# Patient Record
Sex: FEMALE | Race: WHITE
[De-identification: names, ages, dates, MRNs, and addresses within clinical notes are randomized per-mention and may not be internally consistent; named-entity substitution may affect disease eponyms.]

---

## 2021-04-26 ENCOUNTER — HOSPITAL ENCOUNTER (OUTPATIENT)
Age: 21
End: 2021-04-26
Payer: COMMERCIAL

## 2021-04-26 VITALS — BODY MASS INDEX: 34.9 KG/M2

## 2021-04-26 DIAGNOSIS — N83.201: Primary | ICD-10-CM

## 2021-04-26 PROCEDURE — 76830 TRANSVAGINAL US NON-OB: CPT

## 2021-04-26 PROCEDURE — 93976 VASCULAR STUDY: CPT

## 2021-04-27 ENCOUNTER — HOSPITAL ENCOUNTER (OUTPATIENT)
Dept: HOSPITAL 100 - SDC | Age: 21
Discharge: HOME | End: 2021-04-27
Payer: COMMERCIAL

## 2021-04-27 VITALS
HEART RATE: 79 BPM | DIASTOLIC BLOOD PRESSURE: 57 MMHG | RESPIRATION RATE: 16 BRPM | SYSTOLIC BLOOD PRESSURE: 99 MMHG | OXYGEN SATURATION: 100 %

## 2021-04-27 VITALS
OXYGEN SATURATION: 98 % | DIASTOLIC BLOOD PRESSURE: 86 MMHG | RESPIRATION RATE: 16 BRPM | SYSTOLIC BLOOD PRESSURE: 133 MMHG | TEMPERATURE: 97.3 F | HEART RATE: 89 BPM

## 2021-04-27 VITALS
SYSTOLIC BLOOD PRESSURE: 99 MMHG | OXYGEN SATURATION: 100 % | TEMPERATURE: 98.7 F | RESPIRATION RATE: 18 BRPM | DIASTOLIC BLOOD PRESSURE: 57 MMHG | HEART RATE: 64 BPM

## 2021-04-27 VITALS
DIASTOLIC BLOOD PRESSURE: 77 MMHG | OXYGEN SATURATION: 100 % | HEART RATE: 60 BPM | SYSTOLIC BLOOD PRESSURE: 99 MMHG | RESPIRATION RATE: 16 BRPM | TEMPERATURE: 97.34 F

## 2021-04-27 VITALS
SYSTOLIC BLOOD PRESSURE: 99 MMHG | TEMPERATURE: 99.14 F | HEART RATE: 76 BPM | DIASTOLIC BLOOD PRESSURE: 57 MMHG | RESPIRATION RATE: 16 BRPM | OXYGEN SATURATION: 95 %

## 2021-04-27 VITALS — BODY MASS INDEX: 34.2 KG/M2 | BODY MASS INDEX: 34.9 KG/M2

## 2021-04-27 DIAGNOSIS — Z20.828: ICD-10-CM

## 2021-04-27 DIAGNOSIS — N83.01: Primary | ICD-10-CM

## 2021-04-27 LAB
DEPRECATED RDW RBC: 43.3 FL (ref 35.1–43.9)
ERYTHROCYTE [DISTWIDTH] IN BLOOD: 12.5 % (ref 11.6–14.6)
HCT VFR BLD AUTO: 40.9 % (ref 37–47)
HEMOGLOBIN: 13.1 G/DL (ref 12–15)
HGB BLD-MCNC: 13.1 G/DL (ref 12–15)
IMMATURE GRANULOCYTES COUNT: 0.01 X10^3/UL (ref 0–0)
INTERNAL QC VALIDATED?: (no result)
MCV RBC: 93.6 FL (ref 81–99)
MEAN CORP HGB CONC: 32 G/DL (ref 32–36)
MEAN PLATELET VOL.: 10.8 FL (ref 6.2–12)
NRBC FLAGGED BY ANALYZER: 0 % (ref 0–5)
PLATELET # BLD: 251 K/MM3 (ref 150–450)
PLATELET COUNT: 251 K/MM3 (ref 150–450)
RBC # BLD AUTO: 4.37 M/MM3 (ref 4.2–5.4)
RBC DISTRIBUTION WIDTH CV: 12.5 % (ref 11.6–14.6)
RBC DISTRIBUTION WIDTH SD: 43.3 FL (ref 35.1–43.9)
WBC # BLD AUTO: 6.1 K/MM3 (ref 4.4–11)
WHITE BLOOD COUNT: 6.1 K/MM3 (ref 4.4–11)

## 2021-04-27 PROCEDURE — 87426 SARSCOV CORONAVIRUS AG IA: CPT

## 2021-04-27 PROCEDURE — C9803 HOPD COVID-19 SPEC COLLECT: HCPCS

## 2021-04-27 PROCEDURE — 86900 BLOOD TYPING SEROLOGIC ABO: CPT

## 2021-04-27 PROCEDURE — 00840 ANES IPER PX LOWER ABD NOS: CPT

## 2021-04-27 PROCEDURE — 85025 COMPLETE CBC W/AUTO DIFF WBC: CPT

## 2021-04-27 PROCEDURE — 88305 TISSUE EXAM BY PATHOLOGIST: CPT

## 2021-04-27 PROCEDURE — 58661 LAPAROSCOPY REMOVE ADNEXA: CPT

## 2021-04-27 PROCEDURE — 86850 RBC ANTIBODY SCREEN: CPT

## 2021-04-27 PROCEDURE — 86901 BLOOD TYPING SEROLOGIC RH(D): CPT

## 2021-04-27 PROCEDURE — 81025 URINE PREGNANCY TEST: CPT

## 2021-07-19 ENCOUNTER — HOSPITAL ENCOUNTER (OUTPATIENT)
Age: 21
End: 2021-07-19
Payer: COMMERCIAL

## 2021-07-19 VITALS — BODY MASS INDEX: 34.5 KG/M2

## 2021-07-19 DIAGNOSIS — R10.2: Primary | ICD-10-CM

## 2021-07-19 PROCEDURE — 76856 US EXAM PELVIC COMPLETE: CPT

## 2021-07-19 PROCEDURE — 76830 TRANSVAGINAL US NON-OB: CPT

## 2021-10-16 ENCOUNTER — HOSPITAL ENCOUNTER (EMERGENCY)
Age: 21
LOS: 1 days | Discharge: HOME | End: 2021-10-17
Payer: COMMERCIAL

## 2021-10-16 VITALS
HEART RATE: 96 BPM | RESPIRATION RATE: 15 BRPM | DIASTOLIC BLOOD PRESSURE: 79 MMHG | OXYGEN SATURATION: 99 % | SYSTOLIC BLOOD PRESSURE: 124 MMHG

## 2021-10-16 VITALS
OXYGEN SATURATION: 100 % | SYSTOLIC BLOOD PRESSURE: 139 MMHG | HEART RATE: 78 BPM | DIASTOLIC BLOOD PRESSURE: 84 MMHG | TEMPERATURE: 97.9 F | RESPIRATION RATE: 18 BRPM

## 2021-10-16 VITALS
SYSTOLIC BLOOD PRESSURE: 122 MMHG | HEART RATE: 98 BPM | OXYGEN SATURATION: 100 % | DIASTOLIC BLOOD PRESSURE: 77 MMHG | RESPIRATION RATE: 15 BRPM

## 2021-10-16 VITALS — BODY MASS INDEX: 35.5 KG/M2

## 2021-10-16 DIAGNOSIS — R53.83: ICD-10-CM

## 2021-10-16 DIAGNOSIS — R11.0: ICD-10-CM

## 2021-10-16 DIAGNOSIS — R07.9: ICD-10-CM

## 2021-10-16 DIAGNOSIS — R53.81: Primary | ICD-10-CM

## 2021-10-16 DIAGNOSIS — R51.9: ICD-10-CM

## 2021-10-16 DIAGNOSIS — Z79.899: ICD-10-CM

## 2021-10-16 LAB
ALANINE AMINOTRANSFER ALT/SGPT: 38 U/L (ref 13–56)
ALBUMIN SERPL-MCNC: 3.7 G/DL (ref 3.2–5)
ALKALINE PHOSPHATASE: 87 U/L (ref 45–117)
ANION GAP: 8 (ref 5–15)
AST(SGOT): 25 U/L (ref 15–37)
BILIRUB DIRECT SERPL-MCNC: 0.08 MG/DL (ref 0–0.3)
BUN SERPL-MCNC: 10 MG/DL (ref 7–18)
BUN/CREAT RATIO: 12.3 RATIO (ref 10–20)
CALCIUM SERPL-MCNC: 9.1 MG/DL (ref 8.5–10.1)
CARBON DIOXIDE: 23 MMOL/L (ref 21–32)
CHLORIDE: 107 MMOL/L (ref 98–107)
DEPRECATED RDW RBC: 40.9 FL (ref 35.1–43.9)
ERYTHROCYTE [DISTWIDTH] IN BLOOD: 12.3 % (ref 11.6–14.6)
EST GLOM FILT RATE - AFR AMER: 115 ML/MIN (ref 60–?)
ESTIMATED CREATININE CLEARANCE: 102.85 ML/MIN
GLOBULIN: 4.4 G/DL (ref 2.2–4.2)
GLUCOSE: 97 MG/DL (ref 74–106)
HCT VFR BLD AUTO: 41.7 % (ref 37–47)
HEMOGLOBIN: 13.5 G/DL (ref 12–15)
HGB BLD-MCNC: 13.5 G/DL (ref 12–15)
IMMATURE GRANULOCYTES COUNT: 0.01 X10^3/UL (ref 0–0)
INTERNAL QC VALIDATED?: (no result)
KETONE-DIPSTICK: 150 MG/DL
LEUKOCYTE ESTERASE UR QL STRIP: 25 /UL
MCV RBC: 90.8 FL (ref 81–99)
MEAN CORP HGB CONC: 32.4 G/DL (ref 32–36)
MEAN PLATELET VOL.: 10.8 FL (ref 6.2–12)
MUCOUS THREADS URNS QL MICRO: (no result) /HPF
NRBC FLAGGED BY ANALYZER: 0 % (ref 0–5)
PLATELET # BLD: 274 K/MM3 (ref 150–450)
PLATELET COUNT: 274 K/MM3 (ref 150–450)
POTASSIUM: 3.5 MMOL/L (ref 3.5–5.1)
RBC # BLD AUTO: 4.59 M/MM3 (ref 4.2–5.4)
RBC DISTRIBUTION WIDTH CV: 12.3 % (ref 11.6–14.6)
RBC DISTRIBUTION WIDTH SD: 40.9 FL (ref 35.1–43.9)
RBC UR QL: (no result) /HPF (ref 0–5)
SP GR UR: 1.01 (ref 1–1.03)
SQUAMOUS URNS QL MICRO: (no result) /HPF (ref 5–10)
TROPONIN-I HS: 3 PG/ML (ref 3–54)
URINE PRESERVATIVE: (no result)
WBC # BLD AUTO: 7.9 K/MM3 (ref 4.4–11)
WHITE BLOOD COUNT: 7.9 K/MM3 (ref 4.4–11)

## 2021-10-16 PROCEDURE — 80076 HEPATIC FUNCTION PANEL: CPT

## 2021-10-16 PROCEDURE — 84443 ASSAY THYROID STIM HORMONE: CPT

## 2021-10-16 PROCEDURE — 85025 COMPLETE CBC W/AUTO DIFF WBC: CPT

## 2021-10-16 PROCEDURE — 81001 URINALYSIS AUTO W/SCOPE: CPT

## 2021-10-16 PROCEDURE — 84484 ASSAY OF TROPONIN QUANT: CPT

## 2021-10-16 PROCEDURE — 96375 TX/PRO/DX INJ NEW DRUG ADDON: CPT

## 2021-10-16 PROCEDURE — 93005 ELECTROCARDIOGRAM TRACING: CPT

## 2021-10-16 PROCEDURE — 81025 URINE PREGNANCY TEST: CPT

## 2021-10-16 PROCEDURE — 70498 CT ANGIOGRAPHY NECK: CPT

## 2021-10-16 PROCEDURE — 96374 THER/PROPH/DIAG INJ IV PUSH: CPT

## 2021-10-16 PROCEDURE — A4216 STERILE WATER/SALINE, 10 ML: HCPCS

## 2021-10-16 PROCEDURE — 99284 EMERGENCY DEPT VISIT MOD MDM: CPT

## 2021-10-16 PROCEDURE — 80048 BASIC METABOLIC PNL TOTAL CA: CPT

## 2021-10-16 PROCEDURE — 96361 HYDRATE IV INFUSION ADD-ON: CPT

## 2021-10-16 PROCEDURE — 70496 CT ANGIOGRAPHY HEAD: CPT

## 2021-10-17 VITALS
DIASTOLIC BLOOD PRESSURE: 62 MMHG | SYSTOLIC BLOOD PRESSURE: 98 MMHG | HEART RATE: 76 BPM | OXYGEN SATURATION: 98 % | RESPIRATION RATE: 16 BRPM

## 2022-01-26 ENCOUNTER — HOSPITAL ENCOUNTER (EMERGENCY)
Age: 22
Discharge: HOME | End: 2022-01-26
Payer: COMMERCIAL

## 2022-01-26 VITALS
OXYGEN SATURATION: 98 % | DIASTOLIC BLOOD PRESSURE: 64 MMHG | HEART RATE: 69 BPM | SYSTOLIC BLOOD PRESSURE: 122 MMHG | RESPIRATION RATE: 15 BRPM

## 2022-01-26 VITALS — BODY MASS INDEX: 33.3 KG/M2

## 2022-01-26 VITALS
OXYGEN SATURATION: 99 % | HEART RATE: 93 BPM | SYSTOLIC BLOOD PRESSURE: 130 MMHG | DIASTOLIC BLOOD PRESSURE: 82 MMHG | TEMPERATURE: 96.08 F | RESPIRATION RATE: 17 BRPM

## 2022-01-26 VITALS — OXYGEN SATURATION: 98 %

## 2022-01-26 DIAGNOSIS — Z83.79: ICD-10-CM

## 2022-01-26 DIAGNOSIS — K20.90: Primary | ICD-10-CM

## 2022-01-26 DIAGNOSIS — Z86.16: ICD-10-CM

## 2022-01-26 DIAGNOSIS — R07.9: ICD-10-CM

## 2022-01-26 DIAGNOSIS — R06.00: ICD-10-CM

## 2022-01-26 DIAGNOSIS — R10.13: ICD-10-CM

## 2022-01-26 DIAGNOSIS — E66.9: ICD-10-CM

## 2022-01-26 LAB
ANION GAP: 8 (ref 5–15)
B-HCG SERPL QL: NEGATIVE NEGATIVE
BUN SERPL-MCNC: 10 MG/DL (ref 7–18)
BUN/CREAT RATIO: 15 RATIO (ref 10–20)
CALCIUM SERPL-MCNC: 9 MG/DL (ref 8.5–10.1)
CARBON DIOXIDE: 23 MMOL/L (ref 21–32)
CHLORIDE: 107 MMOL/L (ref 98–107)
D-DIMER QUANTITATIVE (DVT/PE): 0.31 FEU/UG/M (ref 0.27–0.49)
DEPRECATED RDW RBC: 42 FL (ref 35.1–43.9)
ERYTHROCYTE [DISTWIDTH] IN BLOOD: 12.9 % (ref 11.6–14.6)
EST GLOM FILT RATE - AFR AMER: 144 ML/MIN (ref 60–?)
ESTIMATED CREATININE CLEARANCE: 124.34 ML/MIN
GLUCOSE: 86 MG/DL (ref 74–106)
HCG SERPL QL: NEGATIVE NEGATIVE
HCT VFR BLD AUTO: 41.7 % (ref 37–47)
HEMOGLOBIN: 13.7 G/DL (ref 12–15)
HGB BLD-MCNC: 13.7 G/DL (ref 12–15)
IMMATURE GRANULOCYTES COUNT: 0.02 X10^3/UL (ref 0–0)
INTERNAL QC VALIDATED?: (no result)
MCV RBC: 88.5 FL (ref 81–99)
MEAN CORP HGB CONC: 32.9 G/DL (ref 32–36)
MEAN PLATELET VOL.: 11.6 FL (ref 6.2–12)
NRBC FLAGGED BY ANALYZER: 0 % (ref 0–5)
PLATELET # BLD: 225 K/MM3 (ref 150–450)
PLATELET COUNT: 225 K/MM3 (ref 150–450)
POTASSIUM: 3.9 MMOL/L (ref 3.5–5.1)
RBC # BLD AUTO: 4.71 M/MM3 (ref 4.2–5.4)
RBC DISTRIBUTION WIDTH CV: 12.9 % (ref 11.6–14.6)
RBC DISTRIBUTION WIDTH SD: 42 FL (ref 35.1–43.9)
TROPONIN-I HS: 4 PG/ML (ref 3–54)
WBC # BLD AUTO: 7 K/MM3 (ref 4.4–11)
WHITE BLOOD COUNT: 7 K/MM3 (ref 4.4–11)

## 2022-01-26 PROCEDURE — 99284 EMERGENCY DEPT VISIT MOD MDM: CPT

## 2022-01-26 PROCEDURE — 80048 BASIC METABOLIC PNL TOTAL CA: CPT

## 2022-01-26 PROCEDURE — 85025 COMPLETE CBC W/AUTO DIFF WBC: CPT

## 2022-01-26 PROCEDURE — 84703 CHORIONIC GONADOTROPIN ASSAY: CPT

## 2022-01-26 PROCEDURE — 85379 FIBRIN DEGRADATION QUANT: CPT

## 2022-01-26 PROCEDURE — 71045 X-RAY EXAM CHEST 1 VIEW: CPT

## 2022-01-26 PROCEDURE — A4216 STERILE WATER/SALINE, 10 ML: HCPCS

## 2022-01-26 PROCEDURE — 93005 ELECTROCARDIOGRAM TRACING: CPT

## 2022-01-26 PROCEDURE — 84484 ASSAY OF TROPONIN QUANT: CPT

## 2024-02-19 ENCOUNTER — OFFICE VISIT (OUTPATIENT)
Dept: URGENT CARE | Facility: CLINIC | Age: 24
End: 2024-02-19
Payer: COMMERCIAL

## 2024-02-19 VITALS
OXYGEN SATURATION: 100 % | SYSTOLIC BLOOD PRESSURE: 118 MMHG | HEART RATE: 90 BPM | HEIGHT: 66 IN | DIASTOLIC BLOOD PRESSURE: 81 MMHG | WEIGHT: 220 LBS | BODY MASS INDEX: 35.36 KG/M2 | RESPIRATION RATE: 18 BRPM | TEMPERATURE: 98.2 F

## 2024-02-19 DIAGNOSIS — J06.9 ACUTE UPPER RESPIRATORY INFECTION: Primary | ICD-10-CM

## 2024-02-19 DIAGNOSIS — J01.00 ACUTE NON-RECURRENT MAXILLARY SINUSITIS: ICD-10-CM

## 2024-02-19 LAB
POC TRIPLEX FLU A-AG: NORMAL
POC TRIPLEX FLU B-AG: NORMAL
POC TRIPLEX SARSCOV-2 AG: NORMAL

## 2024-02-19 PROCEDURE — 99213 OFFICE O/P EST LOW 20 MIN: CPT | Performed by: NURSE PRACTITIONER

## 2024-02-19 PROCEDURE — 87428 SARSCOV & INF VIR A&B AG IA: CPT | Performed by: NURSE PRACTITIONER

## 2024-02-19 RX ORDER — AZITHROMYCIN 250 MG/1
TABLET, FILM COATED ORAL
Qty: 6 TABLET | Refills: 0 | Status: SHIPPED | OUTPATIENT
Start: 2024-02-19 | End: 2024-02-24

## 2024-02-19 NOTE — PROGRESS NOTES
23 y.o. female patient presents for evaluation of sinus pressure. Patient reports 1 week of maxillary sinus pain, nasal congestion, and headache. States she started to feel better but yesterday started to feel worse again. There is reported body aches and chills. No fever, cough, or other constitutional signs and symptoms. Symptoms have been refractory to over-the-counter medications.      Vitals:    02/19/24 1747   BP: 118/81   Pulse: 90   Resp: 18   Temp: 36.8 °C (98.2 °F)   SpO2: 100%       Allergies   Allergen Reactions    Augmentin [Amoxicillin-Pot Clavulanate] Hives       Medication Documentation Review Audit       Reviewed by LORETO Neri (Nurse Practitioner) on 02/19/24 at 1753      Medication Order Taking? Sig Documenting Provider Last Dose Status            No Medications to Display                                   Past Medical History:   Diagnosis Date    H/O unilateral oophorectomy     Other conditions influencing health status     Menstruation       Past Surgical History:   Procedure Laterality Date    OTHER SURGICAL HISTORY  08/06/2021    [place] tooth extraction    OTHER SURGICAL HISTORY  07/22/2022    Ovarian cystectomy    OTHER SURGICAL HISTORY  07/22/2022    Salpingo-oophorectomy       ROS  See HPI    Physical Exam  Vitals and nursing note reviewed.   Constitutional:       General: She is not in acute distress.     Appearance: Normal appearance. She is normal weight. She is not ill-appearing, toxic-appearing or diaphoretic.   HENT:      Head: Normocephalic and atraumatic.      Right Ear: Tympanic membrane and ear canal normal.      Left Ear: Tympanic membrane and ear canal normal.      Nose: Congestion present.      Mouth/Throat:      Mouth: Mucous membranes are moist.      Pharynx: Oropharynx is clear.   Eyes:      Extraocular Movements: Extraocular movements intact.      Conjunctiva/sclera: Conjunctivae normal.      Pupils: Pupils are equal, round, and reactive to light.    Cardiovascular:      Rate and Rhythm: Normal rate and regular rhythm.   Pulmonary:      Effort: Pulmonary effort is normal.      Breath sounds: Normal breath sounds.   Lymphadenopathy:      Cervical: No cervical adenopathy.   Skin:     General: Skin is warm and dry.      Capillary Refill: Capillary refill takes less than 2 seconds.   Neurological:      General: No focal deficit present.      Mental Status: She is alert and oriented to person, place, and time.   Psychiatric:         Mood and Affect: Mood normal.         Behavior: Behavior normal.       Recent Results (from the past 1 hour(s))   POCT BD Veritor Triplex Ag    Collection Time: 02/19/24  6:28 PM   Result Value Ref Range    POC Triplex SARS-CoV-2 Ag  Presumptive negative for Triplex SARS-CoV-2 (no antigen detected)     Presumptive negative for Triplex SARS-CoV-2 (no antigen detected)    POC Triplex Flu A-Ag  Presumptive negative for Triplex FLU A (no antigen detected)     Presumptive negative for Triplex FLU A (no antigen detected)    POC Triplex Flu B-Ag  Presumptive negative for Triplex FLU B (no antigen detected)     Presumptive negative for Triplex FLU B (no antigen detected)       Assessment/Plan/MDM  Lyly was seen today for flu symptoms.  Diagnoses and all orders for this visit:  Acute upper respiratory infection (Primary)  -     POCT BD Veritor Triplex Ag  Acute non-recurrent maxillary sinusitis  -     azithromycin (Zithromax Z-Que) 250 mg tablet; Take 2 tablets (500 mg) on  Day 1,  followed by 1 tablet (250 mg) once daily on Days 2 through 5.    Encouraged pt to continue otc cold remedies PRN, push by mouth fluids and rest. Patient's clinical presentation is otherwise unremarkable at this time. Patient is discharged with instructions to follow-up with primary care or seek emergency medical attention for worsening symptoms or any new concerns.      Miguel Hughes, CNP  [facility]  [phone]

## 2024-02-20 ENCOUNTER — DOCUMENTATION (OUTPATIENT)
Dept: URGENT CARE | Facility: CLINIC | Age: 24
End: 2024-02-20
Payer: COMMERCIAL

## 2024-02-20 DIAGNOSIS — J01.00 ACUTE NON-RECURRENT MAXILLARY SINUSITIS: Primary | ICD-10-CM

## 2024-02-20 RX ORDER — AMOXICILLIN 875 MG/1
875 TABLET, FILM COATED ORAL 2 TIMES DAILY
Qty: 14 TABLET | Refills: 0 | Status: SHIPPED | OUTPATIENT
Start: 2024-02-20 | End: 2024-02-27

## 2024-02-20 NOTE — LETTER
February 20, 2024     Patient: Lyly Sharp   YOB: 2000   Date of Visit: 2/19/2024       To Whom It May Concern:    Lyly Sharp was seen in my clinic on 2/19/2024 at ?. Please excuse Lyly for her absence from work on this day through 2/20/2024.    If you have any questions or concerns, please don't hesitate to call.         Sincerely,         Miguel Hughes, APRN-CNP        CC: No Recipients

## 2024-03-13 ENCOUNTER — OFFICE VISIT (OUTPATIENT)
Dept: URGENT CARE | Facility: CLINIC | Age: 24
End: 2024-03-13
Payer: COMMERCIAL

## 2024-03-13 VITALS
HEIGHT: 66 IN | SYSTOLIC BLOOD PRESSURE: 127 MMHG | WEIGHT: 225 LBS | BODY MASS INDEX: 36.16 KG/M2 | RESPIRATION RATE: 16 BRPM | HEART RATE: 71 BPM | TEMPERATURE: 97.8 F | OXYGEN SATURATION: 99 % | DIASTOLIC BLOOD PRESSURE: 85 MMHG

## 2024-03-13 DIAGNOSIS — J01.00 ACUTE NON-RECURRENT MAXILLARY SINUSITIS: Primary | ICD-10-CM

## 2024-03-13 PROCEDURE — 99213 OFFICE O/P EST LOW 20 MIN: CPT | Performed by: NURSE PRACTITIONER

## 2024-03-13 RX ORDER — AMOXICILLIN 875 MG/1
875 TABLET, FILM COATED ORAL 2 TIMES DAILY
Qty: 20 TABLET | Refills: 0 | Status: SHIPPED | OUTPATIENT
Start: 2024-03-13 | End: 2024-03-20

## 2024-03-13 NOTE — LETTER
March 13, 2024     Patient: Lyly Sharp   YOB: 2000   Date of Visit: 3/13/2024       To Whom It May Concern:    Lyly Sharp was seen in my clinic on 3/13/2024 at 10:20 am. Please excuse Lyly for her absence from work on this day through 3/14/24.    If you have any questions or concerns, please don't hesitate to call.         Sincerely,         Miguel Hughes, APRN-CNP        CC: No Recipients

## 2024-03-13 NOTE — PROGRESS NOTES
23 y.o. female presents with h/a, sinus pressure, sore throat, cough, nausea, weakness for 6 days. Patients denies SOB, chest pain, v/d, fever. Patients states her  is sick but states his covid/flu were negative yesterday at his appointment. Denies OTC meds used.       Vitals:    03/13/24 1031   BP: 127/85   Pulse: 71   Resp: 16   Temp: 36.6 °C (97.8 °F)   SpO2: 99%       Allergies   Allergen Reactions    Augmentin [Amoxicillin-Pot Clavulanate] Hives       Medication Documentation Review Audit       Reviewed by Kathi Billings MA (Medical Assistant) on 03/13/24 at 1030      Medication Order Taking? Sig Documenting Provider Last Dose Status            No Medications to Display                                   Past Medical History:   Diagnosis Date    H/O unilateral oophorectomy     Other conditions influencing health status     Menstruation       Past Surgical History:   Procedure Laterality Date    OTHER SURGICAL HISTORY  08/06/2021    [place] tooth extraction    OTHER SURGICAL HISTORY  07/22/2022    Ovarian cystectomy    OTHER SURGICAL HISTORY  07/22/2022    Salpingo-oophorectomy       ROS  See HPI    Physical Exam  Vitals and nursing note reviewed.   Constitutional:       General: She is not in acute distress.     Appearance: Normal appearance. She is normal weight. She is not ill-appearing, toxic-appearing or diaphoretic.   HENT:      Head: Normocephalic and atraumatic.      Right Ear: Tympanic membrane and ear canal normal.      Left Ear: Tympanic membrane and ear canal normal.      Nose: Congestion (maxillary sinus tenderness bilaterally) present.      Mouth/Throat:      Mouth: Mucous membranes are moist.      Pharynx: Oropharynx is clear.   Eyes:      Extraocular Movements: Extraocular movements intact.      Conjunctiva/sclera: Conjunctivae normal.      Pupils: Pupils are equal, round, and reactive to light.   Cardiovascular:      Rate and Rhythm: Normal rate and regular rhythm.   Pulmonary:      Effort:  Pulmonary effort is normal.      Breath sounds: Normal breath sounds.   Lymphadenopathy:      Cervical: No cervical adenopathy.   Skin:     General: Skin is warm and dry.      Capillary Refill: Capillary refill takes less than 2 seconds.   Neurological:      General: No focal deficit present.      Mental Status: She is alert and oriented to person, place, and time.   Psychiatric:         Mood and Affect: Mood normal.         Behavior: Behavior normal.         Assessment/Plan/MDM  Lyly was seen today for uri.  Diagnoses and all orders for this visit:  Acute non-recurrent maxillary sinusitis (Primary)  -     amoxicillin (Amoxil) 875 mg tablet; Take 1 tablet (875 mg) by mouth 2 times a day for 7 days.    Encouraged pt to continue otc cold remedies PRN, push by mouth fluids and rest. Patient's clinical presentation is otherwise unremarkable at this time. Patient is discharged with instructions to follow-up with primary care or seek emergency medical attention for worsening symptoms or any new concerns.      Miguel Hughes, CNP  [facility]  [phone]

## 2024-06-14 ENCOUNTER — APPOINTMENT (OUTPATIENT)
Dept: PRIMARY CARE | Facility: CLINIC | Age: 24
End: 2024-06-14
Payer: COMMERCIAL

## 2025-01-21 ENCOUNTER — OFFICE VISIT (OUTPATIENT)
Dept: URGENT CARE | Facility: CLINIC | Age: 25
End: 2025-01-21

## 2025-01-21 VITALS
BODY MASS INDEX: 35.36 KG/M2 | OXYGEN SATURATION: 98 % | HEART RATE: 83 BPM | RESPIRATION RATE: 16 BRPM | WEIGHT: 220 LBS | DIASTOLIC BLOOD PRESSURE: 84 MMHG | HEIGHT: 66 IN | SYSTOLIC BLOOD PRESSURE: 123 MMHG | TEMPERATURE: 98.7 F

## 2025-01-21 DIAGNOSIS — R30.0 DYSURIA: Primary | ICD-10-CM

## 2025-01-21 LAB
POC APPEARANCE, URINE: CLEAR
POC BILIRUBIN, URINE: NEGATIVE
POC BLOOD, URINE: NEGATIVE
POC COLOR, URINE: YELLOW
POC GLUCOSE, URINE: NEGATIVE MG/DL
POC KETONES, URINE: NEGATIVE MG/DL
POC LEUKOCYTES, URINE: NEGATIVE
POC NITRITE,URINE: NEGATIVE
POC PH, URINE: 6.5 PH
POC PROTEIN, URINE: NEGATIVE MG/DL
POC SPECIFIC GRAVITY, URINE: 1.01
POC UROBILINOGEN, URINE: 0.2 EU/DL
PREGNANCY TEST URINE, POC: NEGATIVE

## 2025-01-21 PROCEDURE — 99213 OFFICE O/P EST LOW 20 MIN: CPT | Performed by: NURSE PRACTITIONER

## 2025-01-21 PROCEDURE — 81002 URINALYSIS NONAUTO W/O SCOPE: CPT | Performed by: NURSE PRACTITIONER

## 2025-01-21 PROCEDURE — 81025 URINE PREGNANCY TEST: CPT | Performed by: NURSE PRACTITIONER

## 2025-01-21 PROCEDURE — 87591 N.GONORRHOEAE DNA AMP PROB: CPT

## 2025-01-21 PROCEDURE — 87661 TRICHOMONAS VAGINALIS AMPLIF: CPT

## 2025-01-21 PROCEDURE — 87086 URINE CULTURE/COLONY COUNT: CPT

## 2025-01-21 PROCEDURE — 87491 CHLMYD TRACH DNA AMP PROBE: CPT

## 2025-01-21 RX ORDER — NITROFURANTOIN 25; 75 MG/1; MG/1
100 CAPSULE ORAL 2 TIMES DAILY
Qty: 6 CAPSULE | Refills: 0 | Status: SHIPPED | OUTPATIENT
Start: 2025-01-21 | End: 2025-01-24

## 2025-01-21 NOTE — PROGRESS NOTES
24 y.o. female presents for evaluation of dysuria. Patient reports several days of increased urinary frequency, mild suprapubic discomfort, and nauea. Patient denies fever, vomiting, diarrhea, vaginal discharge or odor, flank pains, labia irritation/rash or other constitutional signs and symptoms. Normal BM. Patient reports similar episodes in the past diagnosed as urinary tract infections. No recent UTI. No new sexual partners or STD concerns. No other complaints.      Vitals:    01/21/25 0913   BP: 123/84   Pulse: 83   Resp: 16   Temp: 37.1 °C (98.7 °F)   SpO2: 98%       Allergies   Allergen Reactions    Augmentin [Amoxicillin-Pot Clavulanate] Hives       Medication Documentation Review Audit       Reviewed by LORETO Neri (Nurse Practitioner) on 01/21/25 at 0916      Medication Order Taking? Sig Documenting Provider Last Dose Status            No Medications to Display                                   Past Medical History:   Diagnosis Date    H/O unilateral oophorectomy     Other conditions influencing health status     Menstruation       Past Surgical History:   Procedure Laterality Date    OTHER SURGICAL HISTORY  08/06/2021    [place] tooth extraction    OTHER SURGICAL HISTORY  07/22/2022    Ovarian cystectomy    OTHER SURGICAL HISTORY  07/22/2022    Salpingo-oophorectomy       ROS  See HPI    Physical Exam  Vitals and nursing note reviewed.   Constitutional:       General: She is not in acute distress.     Appearance: Normal appearance. She is not ill-appearing or toxic-appearing.   Cardiovascular:      Rate and Rhythm: Normal rate and regular rhythm.   Abdominal:      General: There is no distension.      Palpations: Abdomen is soft.      Tenderness: There is abdominal tenderness in the suprapubic area. There is no right CVA tenderness, left CVA tenderness, guarding or rebound.   Genitourinary:     General: Normal vulva.      Vagina: No vaginal discharge.   Skin:     General: Skin is warm and  dry.   Neurological:      General: No focal deficit present.      Mental Status: She is alert and oriented to person, place, and time.   Psychiatric:         Mood and Affect: Mood normal.         Behavior: Behavior normal.       Recent Results (from the past hour)   POCT UA (nonautomated w/o microscopy) manually resulted    Collection Time: 01/21/25  9:18 AM   Result Value Ref Range    POC Color, Urine Yellow Straw, Yellow, Light-Yellow    POC Appearance, Urine Clear Clear    POC Glucose, Urine NEGATIVE NEGATIVE mg/dl    POC Bilirubin, Urine NEGATIVE NEGATIVE    POC Ketones, Urine NEGATIVE NEGATIVE mg/dl    POC Specific Gravity, Urine 1.010 1.005 - 1.035    POC Blood, Urine NEGATIVE NEGATIVE    POC PH, Urine 6.5 No Reference Range Established PH    POC Protein, Urine NEGATIVE NEGATIVE mg/dl    POC Urobilinogen, Urine 0.2 0.2, 1.0 EU/DL    Poc Nitrite, Urine NEGATIVE NEGATIVE    POC Leukocytes, Urine NEGATIVE NEGATIVE   POCT pregnancy, urine manually resulted    Collection Time: 01/21/25  9:29 AM   Result Value Ref Range    Preg Test, Ur Negative Negative       Assessment/Plan/MDM  Lyly was seen today for uti.  Diagnoses and all orders for this visit:  Dysuria (Primary)  -     POCT UA (nonautomated w/o microscopy) manually resulted  -     nitrofurantoin, macrocrystal-monohydrate, (Macrobid) 100 mg capsule; Take 1 capsule (100 mg) by mouth 2 times a day for 3 days.  -     Urine Culture  -     POCT pregnancy, urine manually resulted    Discussed other possible etiologies of symptoms with patient as her urine is unremarkable for UTI. She states that these symptoms are identical to prior UTI and prefers treatment today and urine culture. Encouraged patient increase water intake, avoid caffeine/energy drinks, void after intercourse, wipe front to back after voiding and bowel movements, avoid baths/hot tubs/pools, avoid tight fitting garments, empty bladder frequently. Patient's clinical presentation is otherwise  unremarkable at this time. Patient is discharged with instructions to follow-up with primary care or seek emergency medical attention for worsening symptoms or any new concerns.    I did personally review Lyly's past medical history, surgical history, social history, as well as family history (when relevant).  In this case, I also oversaw the her drug management by reviewing her medication list, allergy list, as well as the medications that I prescribed during the UC course and/or recommended as an out-patient (including possible OTC medications such as acetaminophen, NSAIDs , etc).    After reviewing the items above, I did look at previous medical documentation, such as recent hospitalizations, office visits, and/or recent consultations with PCP/specialist.                          SDOH:   Another factor that I considered in Lyly's care was her Social Determinants of Health (SDOH). During this UC encounter, she did not have social determinants of health. Those SDOH influencing Lyly's care are: none      Miguel Hughes CNP  [facility]  [phone]

## 2025-01-22 LAB
BACTERIA UR CULT: NORMAL
C TRACH RRNA SPEC QL NAA+PROBE: NEGATIVE
N GONORRHOEA DNA SPEC QL PROBE+SIG AMP: NEGATIVE
T VAGINALIS RRNA SPEC QL NAA+PROBE: NEGATIVE

## 2025-01-24 ENCOUNTER — TELEPHONE (OUTPATIENT)
Dept: URGENT CARE | Facility: CLINIC | Age: 25
End: 2025-01-24
Payer: COMMERCIAL

## 2025-01-24 NOTE — TELEPHONE ENCOUNTER
Called patient informed her, her urine culture was contaminated and STD was normal. I asked how she was doing she stated she was feeling better so I told her to continue with the antibiotic if she has not already finished it.

## 2025-01-24 NOTE — TELEPHONE ENCOUNTER
Called patient I informed her, her urine calture wasResult Communication    No results found from the In Basket message.    2:31 PM      Results {WERE / WERE NOT:62570} successfully communicated with the {RHEUM PARENT/PATIENT:46624} and they {AMB Acknowledged/Did Not Acknowledge:42937} their understanding.

## 2025-01-24 NOTE — TELEPHONE ENCOUNTER
Please call patient and let her know that her urine culture showed contamination and STD testing was all normal. Please see how she is feeling. If she is still symptomatic she should return to  for another urine culture. If she is improved she may finish antibiotic if she has not already.    Recent Results (from the past 96 hours)   POCT UA (nonautomated w/o microscopy) manually resulted    Collection Time: 01/21/25  9:18 AM   Result Value Ref Range    POC Color, Urine Yellow Straw, Yellow, Light-Yellow    POC Appearance, Urine Clear Clear    POC Glucose, Urine NEGATIVE NEGATIVE mg/dl    POC Bilirubin, Urine NEGATIVE NEGATIVE    POC Ketones, Urine NEGATIVE NEGATIVE mg/dl    POC Specific Gravity, Urine 1.010 1.005 - 1.035    POC Blood, Urine NEGATIVE NEGATIVE    POC PH, Urine 6.5 No Reference Range Established PH    POC Protein, Urine NEGATIVE NEGATIVE mg/dl    POC Urobilinogen, Urine 0.2 0.2, 1.0 EU/DL    Poc Nitrite, Urine NEGATIVE NEGATIVE    POC Leukocytes, Urine NEGATIVE NEGATIVE   POCT pregnancy, urine manually resulted    Collection Time: 01/21/25  9:29 AM   Result Value Ref Range    Preg Test, Ur Negative Negative   Urine Culture    Collection Time: 01/21/25  9:30 AM    Specimen: Clean Catch/Voided; Urine   Result Value Ref Range    Urine Culture       Growth indicates contamination with periurethral dave. Repeat culture if clinically indicated.   C. trachomatis / N. gonorrhoeae, Amplified    Collection Time: 01/21/25  9:35 AM   Result Value Ref Range    Neisseria gonorrhea,Amplified Negative Negative    Chlamydia trachomatis, Amplified Negative Negative   Trichomonas vaginalis, Amplified    Collection Time: 01/21/25  9:35 AM   Result Value Ref Range    Trichomonas Vaginalis Negative Negative, Invalid, TRICH neg

## 2025-03-24 ENCOUNTER — OFFICE VISIT (OUTPATIENT)
Dept: URGENT CARE | Facility: CLINIC | Age: 25
End: 2025-03-24
Payer: COMMERCIAL

## 2025-03-24 VITALS
OXYGEN SATURATION: 100 % | TEMPERATURE: 98 F | DIASTOLIC BLOOD PRESSURE: 84 MMHG | HEART RATE: 78 BPM | RESPIRATION RATE: 16 BRPM | SYSTOLIC BLOOD PRESSURE: 131 MMHG

## 2025-03-24 DIAGNOSIS — M79.661 RIGHT CALF PAIN: Primary | ICD-10-CM

## 2025-03-24 PROCEDURE — 99212 OFFICE O/P EST SF 10 MIN: CPT | Performed by: NURSE PRACTITIONER

## 2025-03-24 NOTE — PROGRESS NOTES
[facility]   GEORGE NOTE:      Name: Lyly Sharp, 24 y.o.    CSN:[id number]   MRN:[id number]    PCP: No primary care provider on file.    ALL:    Allergies   Allergen Reactions    Augmentin [Amoxicillin-Pot Clavulanate] Hives       History:    Chief Complaint: Leg Pain (Rt calf pain x 5 days. No known injury )    Encounter Date: 3/24/2025      HPI: The history was obtained from the patient. Lyly is a 24 y.o. female, who presents with a chief complaint of Leg Pain (Rt calf pain x 5 days. No known injury ) patient presents with right calf pain radiating into the posterior knee and thigh that began 5 days ago after a 2-hour trip to see her parents.  Mild swelling distal to the posterior knee with no bruising noted.  Reports walking can exacerbate pain.  Denies any chest pain, shortness of breath, feeling of impending doom.    PMHx:    Past Medical History:   Diagnosis Date    H/O unilateral oophorectomy     Other conditions influencing health status     Menstruation              No current outpatient medications on file.     No current facility-administered medications for this visit.         PMSx:    Past Surgical History:   Procedure Laterality Date    OTHER SURGICAL HISTORY  08/06/2021    [place] tooth extraction    OTHER SURGICAL HISTORY  07/22/2022    Ovarian cystectomy    OTHER SURGICAL HISTORY  07/22/2022    Salpingo-oophorectomy       Fam Hx: No family history on file.    SOC. Hx:     Social History     Socioeconomic History    Marital status:      Spouse name: Not on file    Number of children: Not on file    Years of education: Not on file    Highest education level: Not on file   Occupational History    Not on file   Tobacco Use    Smoking status: Never    Smokeless tobacco: Never   Substance and Sexual Activity    Alcohol use: Not on file    Drug use: Not on file    Sexual activity: Not on file   Other Topics Concern    Not on file   Social History Narrative    Not on file      Social Drivers of Health     Financial Resource Strain: Not on file   Food Insecurity: Not on file   Transportation Needs: Not on file   Physical Activity: Not on file   Stress: Not on file   Social Connections: Not on file   Intimate Partner Violence: Not on file   Housing Stability: Not on file         Vitals:    03/24/25 1316   BP: 131/84   Pulse: 78   Resp: 16   Temp: 36.7 °C (98 °F)   SpO2: 100%                Physical Exam  Vitals and nursing note reviewed.   Constitutional:       Appearance: Normal appearance.   HENT:      Head: Normocephalic and atraumatic.      Mouth/Throat:      Mouth: Mucous membranes are moist.      Pharynx: Oropharynx is clear.   Eyes:      Pupils: Pupils are equal, round, and reactive to light.   Cardiovascular:      Rate and Rhythm: Normal rate and regular rhythm.      Pulses: Normal pulses. No decreased pulses.           Dorsalis pedis pulses are 2+ on the right side.      Heart sounds: Normal heart sounds.   Pulmonary:      Effort: Pulmonary effort is normal.      Breath sounds: Normal breath sounds.   Abdominal:      General: Abdomen is flat. Bowel sounds are normal.      Palpations: Abdomen is soft.   Musculoskeletal:         General: Normal range of motion.      Cervical back: Normal range of motion.      Right lower leg: Swelling and tenderness present. No deformity, lacerations or bony tenderness.        Legs:    Skin:     General: Skin is warm and dry.      Capillary Refill: Capillary refill takes less than 2 seconds.   Neurological:      Mental Status: She is alert and oriented to person, place, and time.           LABORATORY @ RADIOLOGICAL IMAGING (if done):     Doppler schedule 3/25/2025 @0845  ____________________________________________________________________    I did personally review Lyly's past medical history, surgical history, social history, as well as family history (when relevant).  In this case, I also oversaw the her drug management by reviewing her  medication list, allergy list, as well as the medications that I prescribed during the UC course and/or recommended as an out-patient (including possible OTC medications such as acetaminophen, NSAIDs , etc).    After reviewing the items above, I did look at previous medical documentation, such as recent hospitalizations, office visits, and/or recent consultations with PCP/specialist.                          SDOH:   Another factor that I considered in Lyly's care was her Social Determinants of Health (SDOH). During this UC encounter, she did not have social determinants of health. Those SDOH influencing Lyly's care are: none      _____________________________________________________________________      UC COURSE/MEDICAL DECISION MAKING:    Lyly is a 24 y.o., who presents with a working diagnosis of   1. Right calf pain        Lyly was seen today for leg pain.  Diagnoses and all orders for this visit:  Right calf pain (Primary)  -     Vascular US lower extremity venous duplex right; Future     Pt educated to go to ED with any SOB, chest pain, worsening calf pain or call 911    Plan of care reviewed with patient.  If symptoms change and/or worsen will follow-up with primary care provider, return to urgent care, or go to the nearest emergency department for further evaluation.  Patient states understanding and is agreeable with plan of care.      TACOS Cedillo, DNP   Advanced Practice Provider  [facility]    Please note: While the patient may or may not have received printed discharge paperwork, all relevant medical findings, test results, and treatment details are accessible through the electronic medical record system. The patient is encouraged to review their chart via the patient portal for comprehensive information and follow-up instructions.

## 2025-03-25 ENCOUNTER — HOSPITAL ENCOUNTER (OUTPATIENT)
Dept: VASCULAR MEDICINE | Facility: HOSPITAL | Age: 25
Discharge: HOME | End: 2025-03-25
Payer: COMMERCIAL

## 2025-03-25 DIAGNOSIS — M79.604 PAIN IN RIGHT LEG: ICD-10-CM

## 2025-03-25 DIAGNOSIS — M79.661 RIGHT CALF PAIN: ICD-10-CM

## 2025-03-25 PROCEDURE — 93971 EXTREMITY STUDY: CPT

## 2025-03-25 PROCEDURE — 93971 EXTREMITY STUDY: CPT | Performed by: SURGERY

## 2025-03-26 ENCOUNTER — TELEPHONE (OUTPATIENT)
Dept: URGENT CARE | Facility: CLINIC | Age: 25
End: 2025-03-26
Payer: COMMERCIAL

## 2025-03-26 NOTE — TELEPHONE ENCOUNTER
Result Communication    Resulted Orders   Vascular US lower extremity venous duplex right    [place] [address]  Phone [phone], Fax [phone]       Vascular Lab Report     VASC US LOWER EXTREMITY VENOUS DUPLEX RIGHT    Patient Name:      FRANKIE WHITAKER         Reading Physician:  [id number] Nidia Tello MD  Study Date:        3/25/2025            Ordering Provider:  [id number] NEVILLE FORBES  MRN/PID:           [id number]             Fellow:  Accession#:        [id number]         Technologist:       David Cope RVT  Date of Birth/Age: 2000 / 24 years Technologist 2:  Gender:            F                    Encounter#:         [id number]  Admission Status:  Outpatient           Location Performed: [facility]       Diagnosis/ICD: Pain in right leg-M79.604  CPT Codes:     21032 Peripheral venous duplex scan for DVT Limited       Pertinent History: Leg pain.       CONCLUSIONS:  Right Lower Venous: No evidence of acute deep vein thrombus visualized in the right lower extremity.  Left Lower Venous: The left common femoral vein demonstrates normal spontaneous and respirophasic flow.     Comparison:  Compared with study from 10/18/2021, no significant change.     Imaging & Doppler Findings:     Right                 Compressible Thrombus        Flow  Distal External Iliac                       Spontaneous/Phasic  CFV                       Yes        None   Spontaneous/Phasic  PFV                       Yes        None  FV Proximal               Yes        None   Spontaneous/Phasic  FV Mid                    Yes        None  FV Distal                 Yes        None  Popliteal                 Yes        None   Spontaneous/Phasic  Peroneal                  Yes        None  PTV                       Yes        None       Left        Flow  CFV  Spontaneous/Phasic        [id number] Nidia Tello MD  Electronically signed by [id number] Nidia Tello MD on 3/25/2025 at 7:54:26 PM         ** Final **         9:42 AM      Results were successfully communicated with the patient and they acknowledged their understanding.    Recommended to follow up with PCP if sx persist and do not improve